# Patient Record
Sex: MALE | URBAN - METROPOLITAN AREA
[De-identification: names, ages, dates, MRNs, and addresses within clinical notes are randomized per-mention and may not be internally consistent; named-entity substitution may affect disease eponyms.]

---

## 2021-01-25 ENCOUNTER — EMERGENCY (EMERGENCY)
Facility: HOSPITAL | Age: 26
LOS: 1 days | Discharge: HOME | End: 2021-01-25
Attending: EMERGENCY MEDICINE | Admitting: EMERGENCY MEDICINE
Payer: COMMERCIAL

## 2021-01-25 VITALS
HEART RATE: 91 BPM | DIASTOLIC BLOOD PRESSURE: 78 MMHG | OXYGEN SATURATION: 99 % | RESPIRATION RATE: 18 BRPM | SYSTOLIC BLOOD PRESSURE: 130 MMHG | TEMPERATURE: 98 F

## 2021-01-25 DIAGNOSIS — Z20.822 CONTACT WITH AND (SUSPECTED) EXPOSURE TO COVID-19: ICD-10-CM

## 2021-01-25 PROCEDURE — 99283 EMERGENCY DEPT VISIT LOW MDM: CPT

## 2021-01-25 NOTE — ED PROVIDER NOTE - NSFOLLOWUPINSTRUCTIONS_ED_ALL_ED_FT
Please refer to the paperwork you were given which contains information about how you can access your test results.  In addition there is patient information about COVID-19 symptoms, recommendations for isolation, and a self monitor log should you develop a fever.

## 2021-01-25 NOTE — ED PROVIDER NOTE - OBJECTIVE STATEMENT
Patient is presenting to the Emergency Department with a request to have COVID-19 testing.  Denies symptoms, including cough, shortness of breath, fever, chills, bodyaches or sore throat.  Patient is a  who states two colleagues have tested positive in past two days;

## 2021-01-25 NOTE — ED PROVIDER NOTE - PATIENT PORTAL LINK FT
You can access the FollowMyHealth Patient Portal offered by [facility] by registering at the following website: [email or web address]. By joining [employer]’s FollowMyHealth portal, you will also be able to view your health information using other applications (apps) compatible with our system.

## 2021-01-26 LAB — SARS-COV-2 RNA SPEC QL NAA+PROBE: SIGNIFICANT CHANGE UP

## 2021-03-18 ENCOUNTER — EMERGENCY (EMERGENCY)
Facility: HOSPITAL | Age: 26
LOS: 1 days | Discharge: HOME | End: 2021-03-18
Attending: EMERGENCY MEDICINE | Admitting: EMERGENCY MEDICINE
Payer: COMMERCIAL

## 2021-03-18 VITALS
SYSTOLIC BLOOD PRESSURE: 135 MMHG | TEMPERATURE: 99 F | OXYGEN SATURATION: 96 % | HEART RATE: 75 BPM | DIASTOLIC BLOOD PRESSURE: 80 MMHG | RESPIRATION RATE: 14 BRPM

## 2021-03-18 DIAGNOSIS — Z20.822 CONTACT WITH AND (SUSPECTED) EXPOSURE TO COVID-19: ICD-10-CM

## 2021-03-18 PROCEDURE — 99282 EMERGENCY DEPT VISIT SF MDM: CPT

## 2021-03-18 NOTE — ED PROVIDER NOTE - NSFOLLOWUPINSTRUCTIONS_ED_ALL_ED_FT
IF YOU DO NOT GET YOUR RESULTS WITHIN 48 HOURS PLEASE CALL [phone].    IF YOUR RESULT COMES BACK POSITIVE:    1. STAY HOME for 14 DAYS  2. Minimize Human contact to ONLY ESSENTIAL  3. Every time you wash your hands, sing the HAPPY BIRTHDAY Song so you know you're washing long enough.  Make sure to scrub the webspace between your fingers.  4. DRINK 1-3 Liters of fluids day x at least 5 days.  To remain hydrated. Your fatigue, lightheadedness, and body aches will decrease and your fever has a better chance of breaking if you are well hydrated.    5. For your Fever and Body aches takes Tylenol 650-100mg every 4-6h (max 4000mg/day). Try not to use ibuprofen, aspirin or naproxen (Advil, Motrin or Aleve) as these may worsen Coronavirus infection.  6. RETURN TO THE ER IMMEDIATELY IF YOU HAVE WORSENING SHORTNESS OF BREATH. SYMPTOMS USUALLY PEAK BETWEEN DAY 7-10.

## 2021-03-18 NOTE — ED PROVIDER NOTE - OBJECTIVE STATEMENT
Pt presents to the ED requesting to have screening testing done for COVID-19. Pt reports asymptomatic at this time. Pt denies fevers, chills, coughs, CP, SOB, recent travels or any known contacts with any individuals tested positive for COVID-19.

## 2021-03-18 NOTE — ED PROVIDER NOTE - PATIENT PORTAL LINK FT
You can access the FollowMyHealth Patient Portal offered by [facility] by registering at the following website: [email or web address]. By joining [employer]’s FollowMyHealth portal, you will also be able to view your health information using other applications (apps) compatible with our system.

## 2021-03-19 LAB — SARS-COV-2 RNA SPEC QL NAA+PROBE: SIGNIFICANT CHANGE UP

## 2022-02-09 NOTE — ED PROVIDER NOTE - HISTORY ATTESTATION, MLM
Do You Have Any Concerning Skin Lesions Today?  If No, You Do Not Have To Fill Out The Remainder Of The Questions.: no I have reviewed and confirmed nurses' notes...

## 2024-01-31 ENCOUNTER — EMERGENCY (EMERGENCY)
Facility: HOSPITAL | Age: 29
LOS: 1 days | Discharge: HOME | End: 2024-01-31
Attending: STUDENT IN AN ORGANIZED HEALTH CARE EDUCATION/TRAINING PROGRAM | Admitting: STUDENT IN AN ORGANIZED HEALTH CARE EDUCATION/TRAINING PROGRAM
Payer: COMMERCIAL

## 2024-01-31 VITALS
DIASTOLIC BLOOD PRESSURE: 92 MMHG | RESPIRATION RATE: 16 BRPM | HEART RATE: 66 BPM | TEMPERATURE: 98 F | OXYGEN SATURATION: 97 % | HEIGHT: 72 IN | SYSTOLIC BLOOD PRESSURE: 134 MMHG | WEIGHT: 229.94 LBS

## 2024-01-31 VITALS
RESPIRATION RATE: 18 BRPM | HEART RATE: 59 BPM | DIASTOLIC BLOOD PRESSURE: 83 MMHG | TEMPERATURE: 98 F | OXYGEN SATURATION: 96 % | SYSTOLIC BLOOD PRESSURE: 125 MMHG

## 2024-01-31 DIAGNOSIS — Z87.828 PERSONAL HISTORY OF OTHER (HEALED) PHYSICAL INJURY AND TRAUMA: Chronic | ICD-10-CM

## 2024-01-31 PROBLEM — Z78.9 OTHER SPECIFIED HEALTH STATUS: Chronic | Status: ACTIVE | Noted: 2021-01-25

## 2024-01-31 PROCEDURE — 76376 3D RENDER W/INTRP POSTPROCES: CPT

## 2024-01-31 PROCEDURE — 76376 3D RENDER W/INTRP POSTPROCES: CPT | Mod: 26

## 2024-01-31 PROCEDURE — 99284 EMERGENCY DEPT VISIT MOD MDM: CPT | Mod: 25

## 2024-01-31 PROCEDURE — 73700 CT LOWER EXTREMITY W/O DYE: CPT | Mod: MA

## 2024-01-31 PROCEDURE — 99053 MED SERV 10PM-8AM 24 HR FAC: CPT

## 2024-01-31 PROCEDURE — 73700 CT LOWER EXTREMITY W/O DYE: CPT | Mod: 26,RT,MA

## 2024-01-31 PROCEDURE — 99284 EMERGENCY DEPT VISIT MOD MDM: CPT

## 2024-01-31 RX ORDER — IBUPROFEN 200 MG
600 TABLET ORAL ONCE
Refills: 0 | Status: COMPLETED | OUTPATIENT
Start: 2024-01-31 | End: 2024-01-31

## 2024-01-31 RX ADMIN — Medication 600 MILLIGRAM(S): at 02:56

## 2024-01-31 RX ADMIN — Medication 600 MILLIGRAM(S): at 02:12

## 2024-01-31 NOTE — ED PROVIDER NOTE - NSFOLLOWUPINSTRUCTIONS_ED_ALL_ED_FT
Please follow up with orthopedics.  Take Motrin or Tylenol as needed and apply ice to your knee.  Rest and avoid heavy lifting and over-exertion.  Return to the ER for persistent pain, swelling, numbness, tingling, gait disturbance, or any other concerns.     Medial Collateral Knee Ligament Sprain       The medial collateral ligament (MCL) is a tough band of tissue in the knee that connects the thigh bone to the shin bone. Your MCL prevents your knee from moving too far inward and helps to keep your knee stable. An MCL sprain is a stretch or tear in the MCL.    What are the causes?  This condition may be caused by:    A hard, direct hit (trauma) to the inside of your knee. This is a common cause.  Your knee falling inward when you run, change directions quickly (cut), jump, or pivot.  Repeatedly overstretching the MCL.    What increases the risk?  The following factors make you more likely to develop this condition:    Playing contact sports, such as wrestling or football.  Participating in sports that involve sudden movements of cutting, twisting, or turning. These movements are common in hockey, skiing, and soccer.  Having weak hip and core muscles.    What are the signs or symptoms?  Symptoms of this condition include:    Feeling or hearing a popping at the time of injury.  Pain on the inside of the knee.  Swelling in the knee.  Bruising around the knee.  Tenderness when pressing the inside of the knee.  Feeling unstable when you stand, like your knee will give way.  Difficulty walking on uneven surfaces.    How is this diagnosed?  This condition may be diagnosed based on:    Your medical history.  A physical exam.  Imaging tests, such as an X-ray, ultrasound, or MRI.    During your physical exam, your health care provider will check for pain, limited motion, and instability.    How is this treated?  Treatment for this condition depends on how severe the injury is. Treatment may include:    Keeping weight off the knee until swelling and pain improve.  Raising (elevating) the knee above the level of your heart. This helps to reduce swelling.  Icing the knee. This helps to reduce swelling.  Taking an NSAID, such as ibuprofen. This helps to reduce pain and swelling.  Using a knee brace, elastic sleeve, or crutches while the injury heals.  Using a knee brace when participating in athletic activities.  Doing rehab exercises (physical therapy).  Surgery. This may be needed if:    Your MCL tore all the way through.  Your knee is unstable.  Your knee is not getting better with other treatments.    Follow these instructions at home:      If you have a brace or sleeve:    Wear it as told by your health care provider. Remove it only as told by your health care provider.  Loosen the brace or remove the sleeve if your toes tingle, become numb, or turn cold and blue.  Keep the brace or sleeve clean.  If the brace or sleeve is not waterproof:    Do not let it get wet.  Cover it with a watertight covering when you take a bath or shower.        Managing pain, stiffness, and swelling     If directed, put ice on the inside area of your knee.    If you have a removable brace or sleeve, remove it as told by your health care provider.  Put ice in a plastic bag.  Place a towel between your skin and the bag.  Leave the ice on for 20 minutes, 2–3 times a day.  Move your foot and toes often to reduce stiffness and swelling.  Elevate the injured area above the level of your heart while you are sitting or lying down.        Activity    Ask your health care provider when it is safe to drive if you have a brace or sleeve on your leg.  Return to your normal activities as told by your health care provider. Ask your health care provider what activities are safe for you.  Do exercises as told by your health care provider.  Do not use the injured leg to support your body weight until your health care provider says that you can. Use crutches as told by your health care provider.        General instructions    Take over-the-counter and prescription medicines only as told by your health care provider.  Do not use any products that contain nicotine or tobacco, such as cigarettes, e-cigarettes, and chewing tobacco. These can delay healing. If you need help quitting, ask your health care provider.  Keep all follow-up visits as told by your health care provider. This is important.    How is this prevented?  Warm up and stretch before being active.  Cool down and stretch after being active.  Give your body time to rest between periods of activity.  Make sure to use equipment that fits you.  Be safe and responsible while being active. This will help you avoid falls.  Do at least 150 minutes of moderate-intensity exercise each week, such as brisk walking or water aerobics.  Maintain physical fitness, including:    Strength.  Flexibility.  Cardiovascular fitness.  Endurance.    Contact a health care provider if:  Your symptoms do not improve.  Your symptoms get worse.    Summary  An MCL sprain is a knee injury that is caused by stretching the MCL too far. The injury can involve a tear in the MCL.  Treatment for this condition depends on how severe the injury is. It may include rest, wearing a brace, or surgery.  Do not use the injured leg to support your body weight until your health care provider says that you can. Use crutches as told by your health care provider.  Contact a health care provider if your symptoms do not get better or they get worse.  Keep all follow-up visits as told by your health care provider. This is important.    ADDITIONAL NOTES AND INSTRUCTIONS    Please follow up with your Primary MD in 24-48 hr.  Seek immediate medical care for any new/worsening signs or symptoms.

## 2024-01-31 NOTE — ED ADULT NURSE NOTE - OBJECTIVE STATEMENT
Pt BIBEMS c/o right knee pain.  Pt states while lifting pt into ambulance he stepped into a pothole and felt 2-3 pops medial side right knee.  No deformity or swelling noted.  h/o meniscus repair in 2015.  Pt is able to ambulate with minimal assist.  Pending CT scan.  Pt given motrin 600mg po for pain.  Maintain comfort and safety.

## 2024-01-31 NOTE — ED PROVIDER NOTE - CHPI ED SYMPTOMS NEG
no abrasion/no deformity/no fever/no numbness/no stiffness/no tingling/no bruising/no difficulty bearing weight

## 2024-01-31 NOTE — ED PROVIDER NOTE - NSFOLLOWUPCLINICS_GEN_ALL_ED_FT
[facility]  Orthopedic Surgery  [address] [address]  Phone: [phone]  Fax:

## 2024-01-31 NOTE — ED PROVIDER NOTE - DIFFERENTIAL DIAGNOSIS
Differential Diagnosis Ddx includes but not limited to fracture, dislocation, sprain, contusion, hematoma, ligamentous injury, patella tendin injury

## 2024-01-31 NOTE — ED PROVIDER NOTE - OBJECTIVE STATEMENT
28-year-old male with no significant PMH presents with right knee pain.  Patient is a , BIBA.  States that he was lifting a patient onto a stretcher and he put his right foot into a pothole that he did not see.  His right knee twisted and he heard several "popping" sounds to the medial aspect of his right knee.  He felt immediate pain but was still able to weight-bear.  This injury occurred approximately 20 minutes prior to arrival.  Patient has a history of a right knee torn meniscus from a hockey injury and had it surgically repaired in 2015.  Patient denies any other injuries.  He took no medication for the pain prior to arrival.  PMD Dr. Schulte

## 2024-01-31 NOTE — ED PROVIDER NOTE - CARE PROVIDERS DIRECT ADDRESSES
[email or web address]

## 2024-01-31 NOTE — ED PROVIDER NOTE - PROVIDER TOKENS
PROVIDER:[TOKEN:[[id number]]],PROVIDER:[TOKEN:[[id number]]],PROVIDER:[TOKEN:[[id number]]]

## 2024-01-31 NOTE — ED PROVIDER NOTE - CARE PROVIDER_API CALL
Junior Schulte  Internal Medicine  [address] [place] [address]  Phone: [phone]  Fax: [phone]  Follow Up Time:     Layo Zaman  Orthopaedic Surgery  [address], [address]  [place] [address]  Phone: [phone]  Fax: [phone]  Follow Up Time:     Harshal Pham  Orthopaedic Surgery  [address] [place] [address]  Phone: [phone]  Fax: [phone]  Follow Up Time:

## 2024-01-31 NOTE — ED ADULT TRIAGE NOTE - WEIGHT METHOD
CC:   Chief Complaint   Patient presents with   • Arm Injury     [place] playground yesterday onto his left arm from about 4-5 feet up, still having left elbow, lower left arm pain         SUBJECTIVE:    Lalit Dunn is a 8 year old male who presents to Immediate Care with left wrist and forearm pain.  Pain started yesterday after a fall.  Patient landed on outstretched hand.  He has no numbness, tingling.  Patient states that he has weakness due to the pain when he tries to move in certain directions.     The remainder of the ROS is negative.    Patient Active Problem List   Diagnosis   • Ankyloglossia   • Bilateral serous otitis media   • Hypertrophy of tonsil   • Oral phase dysphagia   • Prematurity, 2,000-2,499 grams, 33-34 completed weeks     ALLERGIES:  Patient has no known allergies.    OBJECTIVE:    Vitals:    09/07/22 0735   Pulse: 89   Resp: 24   Temp: 97.3 °F (36.3 °C)   TempSrc: Temporal   SpO2: 97%   Weight: 44.5 kg (98 lb)   PainSc: 8    PainLoc: Arm      Gen: Alert, well hydrated, in no apparent distress  Musculoskeletal: Patient has pain with full extension and full flexion of the left elbow.  Patient subjectively states that the pain radiates down the forearm to about the mid forearm area.  No ecchymosis, erythema, or calor.  Patient has tenderness with palpation of the lateral medial portion of the elbow but no tenderness at the olecranon.    X-RAY: ABNORMAL - Opacity concerning for avulsion fracture..  Results were independently reviewed and interpreted and discussed with patient    ASSESSMENT/PLAN:    Acute left elbow fracture: Patient put in sling/post mold.  Pain control discussed.  Will follow-up with orthopedics.    Diagnosis and prognosis, treatment and side-effects were explained and all questions were answered satisfactorily and there were no further questions upon discharge.    Electronically signed by: Gene Vaz DO  9/7/2022  
Father of pt is calling at this time and requesting a disc of xray  from this am, I did check with xray, they will prepare the disc for pt, family of pt to report to lower in approx 15mins, check in at desk so xray know they are here to  disc, father of pt notified. Father of Pt verbalization understand    
Splint Application    Date/Time: 9/7/2022 8:54 AM  Performed by: Betzaida Hester RN  Authorized by: Gene Vaz DO     Procedure details:     Location:  Elbow    Elbow location:  L elbow    Cast type:  Long arm    Supplies:  Elastic bandage, cotton padding and prefabricated splint    Attestation: Splint applied and adjusted personally by me    Post-procedure details:     Distal neurologic exam:  Normal    Distal perfusion: distal pulses strong and brisk capillary refill      Procedure completion:  Tolerated well, no immediate complications      
stated

## 2024-01-31 NOTE — ED PROVIDER NOTE - CLINICAL SUMMARY MEDICAL DECISION MAKING FREE TEXT BOX
28 year old male p/w pain to medial aspect of his right knee that occurred when accidentally twisting his right foot in pothole while lifting a stretcher.  He reports hearing a "popping" sound in his knee.  Had a meniscus repair in the right knee in 2015.  CT, analgesia, ortho follow up, knee brace

## 2024-01-31 NOTE — ED PROVIDER NOTE - PATIENT PORTAL LINK FT
You can access the FollowMyHealth Patient Portal offered by [facility] by registering at the following website: [email or web address]. By joining [employer]’s FollowMyHealth portal, you will also be able to view your health information using other applications (apps) compatible with our system.

## 2024-01-31 NOTE — ED PROVIDER NOTE - ADDITIONAL NOTES AND INSTRUCTIONS:
Office Kaushik was seen and evaluated in the Emergency Room.  He is to avoid heavy lifting, excessive physical exertion, or strenous labor until evaluated and cleared by the orthopedic surgeon. Office Kaushik was seen and evaluated in the Emergency Room.  He is to avoid heavy lifting, excessive physical exertion, or strenuous labor until evaluated and cleared by the orthopedic surgeon. Officer Wild Faulkner was seen and evaluated in the Emergency Room.  He is to avoid heavy lifting, excessive physical exertion, or strenuous labor until evaluated and cleared by the orthopedic surgeon.

## 2024-01-31 NOTE — ED ADULT TRIAGE NOTE - CHIEF COMPLAINT QUOTE
per ems from work, was lifting a pt, felt a pop to side of right knee. pt has hx of torn meniscus to right knee